# Patient Record
Sex: MALE | NOT HISPANIC OR LATINO | Employment: OTHER | URBAN - METROPOLITAN AREA
[De-identification: names, ages, dates, MRNs, and addresses within clinical notes are randomized per-mention and may not be internally consistent; named-entity substitution may affect disease eponyms.]

---

## 2020-03-02 DIAGNOSIS — Z12.11 SCREENING FOR COLON CANCER: Primary | ICD-10-CM

## 2020-03-11 ENCOUNTER — OUTSIDE FACILITY SERVICE (OUTPATIENT)
Dept: GASTROENTEROLOGY | Facility: CLINIC | Age: 80
End: 2020-03-11

## 2020-03-11 ENCOUNTER — LAB REQUISITION (OUTPATIENT)
Dept: LAB | Facility: HOSPITAL | Age: 80
End: 2020-03-11

## 2020-03-11 DIAGNOSIS — K92.1 MELENA: ICD-10-CM

## 2020-03-11 PROCEDURE — 45381 COLONOSCOPY SUBMUCOUS NJX: CPT | Performed by: INTERNAL MEDICINE

## 2020-03-11 PROCEDURE — 45385 COLONOSCOPY W/LESION REMOVAL: CPT | Performed by: INTERNAL MEDICINE

## 2020-03-11 PROCEDURE — 88305 TISSUE EXAM BY PATHOLOGIST: CPT | Performed by: INTERNAL MEDICINE

## 2020-03-11 PROCEDURE — 45388 COLONOSCOPY W/ABLATION: CPT | Performed by: INTERNAL MEDICINE

## 2020-03-11 PROCEDURE — 45380 COLONOSCOPY AND BIOPSY: CPT | Performed by: INTERNAL MEDICINE

## 2020-03-12 LAB
CYTO UR: NORMAL
LAB AP CASE REPORT: NORMAL
LAB AP CLINICAL INFORMATION: NORMAL
LAB AP DIAGNOSIS COMMENT: NORMAL
PATH REPORT.FINAL DX SPEC: NORMAL
PATH REPORT.GROSS SPEC: NORMAL

## 2020-06-25 ENCOUNTER — OFFICE VISIT (OUTPATIENT)
Dept: NEUROSURGERY | Facility: CLINIC | Age: 80
End: 2020-06-25

## 2020-06-25 VITALS
WEIGHT: 150 LBS | SYSTOLIC BLOOD PRESSURE: 220 MMHG | TEMPERATURE: 97.8 F | DIASTOLIC BLOOD PRESSURE: 60 MMHG | BODY MASS INDEX: 20.32 KG/M2 | HEIGHT: 72 IN

## 2020-06-25 DIAGNOSIS — G89.29 CHRONIC LOW BACK PAIN WITHOUT SCIATICA, UNSPECIFIED BACK PAIN LATERALITY: Primary | ICD-10-CM

## 2020-06-25 DIAGNOSIS — M54.50 CHRONIC LOW BACK PAIN WITHOUT SCIATICA, UNSPECIFIED BACK PAIN LATERALITY: Primary | ICD-10-CM

## 2020-06-25 PROCEDURE — 99203 OFFICE O/P NEW LOW 30 MIN: CPT | Performed by: PHYSICIAN ASSISTANT

## 2020-06-25 RX ORDER — HYDROCHLOROTHIAZIDE 12.5 MG/1
CAPSULE, GELATIN COATED ORAL
COMMUNITY
Start: 2020-06-19

## 2020-06-25 RX ORDER — CYCLOBENZAPRINE HCL 5 MG
5 TABLET ORAL 3 TIMES DAILY PRN
Qty: 60 TABLET | Refills: 1 | Status: SHIPPED | OUTPATIENT
Start: 2020-06-25 | End: 2020-07-14

## 2020-06-25 RX ORDER — DILTIAZEM HYDROCHLORIDE 120 MG/1
120 TABLET, FILM COATED ORAL 4 TIMES DAILY
COMMUNITY

## 2020-06-25 RX ORDER — LIDOCAINE 50 MG/G
1 PATCH TOPICAL EVERY 24 HOURS
Qty: 30 PATCH | Refills: 1 | Status: SHIPPED | OUTPATIENT
Start: 2020-06-25

## 2020-06-25 RX ORDER — CLOPIDOGREL BISULFATE 75 MG/1
TABLET ORAL DAILY
COMMUNITY
Start: 2020-04-07

## 2020-06-25 RX ORDER — QUINAPRIL 40 MG/1
40 TABLET ORAL DAILY
COMMUNITY
Start: 2020-04-07

## 2020-06-25 NOTE — PROGRESS NOTES
Patient: Brando Crowell  : 1940  Gender: male    Primary Care Provider: Chaz Calderon III, MD      Chief Complaint:   Chief Complaint   Patient presents with   • Back Pain       History of Present Illness:  Brando Crowell is a 79-year-old retired gentleman who presents today for evaluation of back pain.  Patient reports low back pain that started in January with no fall or trauma prior to onset.  He denies leg pain, sensory alteration his lower extremities, bowel or bladder dysfunction.  He has not tried any medication for pain.  He has not been to formal therapy.  He denies previous intervention on his back.  He presents today with a lumbar CT report.    Patient has a PMH significant for COPD, CAD, HTN.  He smokes 2 packs/day.      Past Medical and Surgical History:  Past Medical History:   Diagnosis Date   • Emphysema lung (CMS/HCC)    • Heart disease    • Hypertension    • Prostate cancer (CMS/HCC)      Past Surgical History:   Procedure Laterality Date   • UMBILICAL HERNIA REPAIR         Current Medications:    Current Outpatient Medications:   •  dilTIAZem (CARDIZEM) 120 MG tablet, Take 120 mg by mouth 4 (Four) Times a Day., Disp: , Rfl:   •  clopidogrel (PLAVIX) 75 MG tablet, Take  by mouth Daily., Disp: , Rfl:   •  cyclobenzaprine (FLEXERIL) 5 MG tablet, Take 1 tablet by mouth 3 (Three) Times a Day As Needed for Muscle Spasms., Disp: 60 tablet, Rfl: 1  •  hydroCHLOROthiazide (MICROZIDE) 12.5 MG capsule, TK 1 C PO QD IN THE MORNING, Disp: , Rfl:   •  lidocaine (Lidoderm) 5 %, Place 1 patch on the skin as directed by provider Daily. Remove & Discard patch within 12 hours or as directed by MD, Disp: 30 patch, Rfl: 1  •  quinapril (ACCUPRIL) 40 MG tablet, Take 40 mg by mouth Daily., Disp: , Rfl:   •  Sod Picosulfate-Mag Ox-Cit Acd 10-3.5-12 MG-GM -GM/160ML solution, Take 1 kit by mouth Take As Directed. Follow instructions that were mailed to your home. If you didn't receive these call [phone]  [phone]., Disp: 2 bottle, Rfl: 0    Allergies:  No Known Allergies    Review of Systems:  Review of Systems   HENT: Positive for hearing loss.    Respiratory: Positive for cough and shortness of breath.    Cardiovascular: Positive for leg swelling.   Gastrointestinal: Positive for abdominal distention, abdominal pain and diarrhea.   All other systems reviewed and are negative.        Physical Examination:  Vitals:    06/25/20 1143   BP: (!) 220/60   Temp: 97.8 °F (36.6 °C)   Weight: 68 kg (150 lb)   Height: 182.9 cm (72\")       Physical Exam   Constitutional: He is oriented to person, place, and time. He appears well-developed and well-nourished. No distress.   HENT:   Head: Normocephalic and atraumatic.   Neck: Normal range of motion. Neck supple.   Musculoskeletal: Normal range of motion. He exhibits no edema, tenderness or deformity.   Neurological: He is alert and oriented to person, place, and time. He has normal strength. No sensory deficit. Coordination and gait normal.   Reflex Scores:       Bicep reflexes are 2+ on the right side and 2+ on the left side.       Brachioradialis reflexes are 2+ on the right side and 2+ on the left side.       Patellar reflexes are 1+ on the right side and 1+ on the left side.       Achilles reflexes are 1+ on the right side and 1+ on the left side.  Gait appears normal  Increased thoracic kyphosis noted upon standing and walking   Skin: Skin is warm and dry. He is not diaphoretic.   Psychiatric: He has a normal mood and affect.       Imaging Review:  CT lumbar spine dated 1/31/2020 CT imaging report only available which states moderate to advanced degenerative changes with multilevel old mild compression fractures.  Moderate to advanced neuroforaminal narrowing in the lower lumbar spine.    Assessment:  1.  Low back pain    Plan:  Brando Crowell is seen today for evaluation of low back pain which occurred around 6 months ago with no fall or trauma prior to onset.  His back " pain appears to be axial in nature with no radicular component.  He denies symptoms of neurogenic claudication.  I referred him to physical therapy and pain management for consideration of EMMANUEL.  He is unable to tolerate NSAIDs therefore I recommended Tylenol and Flexeril when needed for pain.    Patient's blood pressure was 220/60 upon arrival today with recheck after our visit 162/60.  I have counseled patient on the risks of uncontrolled hypertension and encouraged him to see his PCP soon as possible regarding this issue.    Patient return in 2-3 months following some therapy or sooner if clinically indicated.      Valarie Bell PA-C

## 2020-07-06 ENCOUNTER — TELEPHONE (OUTPATIENT)
Dept: NEUROSURGERY | Facility: CLINIC | Age: 80
End: 2020-07-06

## 2020-07-06 NOTE — TELEPHONE ENCOUNTER
Provider:  Topher  Caller: fax  Time of call:   8:37  Phone #:  [phone]  Surgery:  no  Surgery Date:    Last visit:   06/25/20  Next visit: 08/24/20    MU:         Reason for call:     Insurance will not pay for Flexeril or Lidocaine 5 % patch, even with a PA.

## 2020-07-14 RX ORDER — TIZANIDINE 2 MG/1
TABLET ORAL
Qty: 60 TABLET | Refills: 1 | Status: SHIPPED | OUTPATIENT
Start: 2020-07-14

## 2020-07-14 NOTE — TELEPHONE ENCOUNTER
Provider:Topher    Caller: Pharmacy    Surgery:  n/a  Last visit:   6/25/2020  Next visit: 8/24/20       Reason for call:       Requested Prescriptions     Pending Prescriptions Disp Refills   • tiZANidine (ZANAFLEX) 2 MG tablet [Pharmacy Med Name: TIZANIDINE 2MG TABLETS] 30 tablet      Sig: TAKE 1 TABLET BY MOUTH THREE TIMES DAILY

## 2020-07-28 ENCOUNTER — LAB (OUTPATIENT)
Dept: LAB | Facility: HOSPITAL | Age: 80
End: 2020-07-28

## 2020-07-28 ENCOUNTER — OFFICE VISIT (OUTPATIENT)
Dept: GASTROENTEROLOGY | Facility: CLINIC | Age: 80
End: 2020-07-28

## 2020-07-28 VITALS
TEMPERATURE: 97.3 F | BODY MASS INDEX: 20.48 KG/M2 | OXYGEN SATURATION: 98 % | WEIGHT: 151 LBS | HEART RATE: 50 BPM | SYSTOLIC BLOOD PRESSURE: 188 MMHG | DIASTOLIC BLOOD PRESSURE: 98 MMHG

## 2020-07-28 DIAGNOSIS — R10.9 CHRONIC ABDOMINAL PAIN: ICD-10-CM

## 2020-07-28 DIAGNOSIS — R10.9 CHRONIC ABDOMINAL PAIN: Primary | ICD-10-CM

## 2020-07-28 DIAGNOSIS — Z11.59 ENCOUNTER FOR SCREENING FOR OTHER VIRAL DISEASES: ICD-10-CM

## 2020-07-28 DIAGNOSIS — K56.50 INTESTINAL ADHESIONS WITH OBSTRUCTION, UNSPECIFIED WHETHER PARTIAL OR COMPLETE (HCC): ICD-10-CM

## 2020-07-28 DIAGNOSIS — G89.29 CHRONIC ABDOMINAL PAIN: ICD-10-CM

## 2020-07-28 DIAGNOSIS — G89.29 CHRONIC ABDOMINAL PAIN: Primary | ICD-10-CM

## 2020-07-28 LAB
BASOPHILS # BLD AUTO: 0.04 10*3/MM3 (ref 0–0.2)
BASOPHILS NFR BLD AUTO: 0.5 % (ref 0–1.5)
DEPRECATED RDW RBC AUTO: 44.9 FL (ref 37–54)
EOSINOPHIL # BLD AUTO: 0.18 10*3/MM3 (ref 0–0.4)
EOSINOPHIL NFR BLD AUTO: 2.3 % (ref 0.3–6.2)
ERYTHROCYTE [DISTWIDTH] IN BLOOD BY AUTOMATED COUNT: 14.8 % (ref 12.3–15.4)
HCT VFR BLD AUTO: 35.9 % (ref 37.5–51)
HGB BLD-MCNC: 11.7 G/DL (ref 13–17.7)
IMM GRANULOCYTES # BLD AUTO: 0.09 10*3/MM3 (ref 0–0.05)
IMM GRANULOCYTES NFR BLD AUTO: 1.2 % (ref 0–0.5)
LYMPHOCYTES # BLD AUTO: 1.61 10*3/MM3 (ref 0.7–3.1)
LYMPHOCYTES NFR BLD AUTO: 20.7 % (ref 19.6–45.3)
MCH RBC QN AUTO: 26.8 PG (ref 26.6–33)
MCHC RBC AUTO-ENTMCNC: 32.6 G/DL (ref 31.5–35.7)
MCV RBC AUTO: 82.3 FL (ref 79–97)
MONOCYTES # BLD AUTO: 0.78 10*3/MM3 (ref 0.1–0.9)
MONOCYTES NFR BLD AUTO: 10 % (ref 5–12)
NEUTROPHILS NFR BLD AUTO: 5.09 10*3/MM3 (ref 1.7–7)
NEUTROPHILS NFR BLD AUTO: 65.3 % (ref 42.7–76)
NRBC BLD AUTO-RTO: 0 /100 WBC (ref 0–0.2)
PLATELET # BLD AUTO: 314 10*3/MM3 (ref 140–450)
PMV BLD AUTO: 11.1 FL (ref 6–12)
RBC # BLD AUTO: 4.36 10*6/MM3 (ref 4.14–5.8)
WBC # BLD AUTO: 7.79 10*3/MM3 (ref 3.4–10.8)

## 2020-07-28 PROCEDURE — 36415 COLL VENOUS BLD VENIPUNCTURE: CPT

## 2020-07-28 PROCEDURE — 99214 OFFICE O/P EST MOD 30 MIN: CPT | Performed by: INTERNAL MEDICINE

## 2020-07-28 PROCEDURE — 86671 FUNGUS NES ANTIBODY: CPT

## 2020-07-28 PROCEDURE — 86480 TB TEST CELL IMMUN MEASURE: CPT

## 2020-07-28 PROCEDURE — 83516 IMMUNOASSAY NONANTIBODY: CPT

## 2020-07-28 PROCEDURE — 80053 COMPREHEN METABOLIC PANEL: CPT

## 2020-07-28 PROCEDURE — 86140 C-REACTIVE PROTEIN: CPT

## 2020-07-28 PROCEDURE — 85025 COMPLETE CBC W/AUTO DIFF WBC: CPT

## 2020-07-28 PROCEDURE — 86704 HEP B CORE ANTIBODY TOTAL: CPT

## 2020-07-28 PROCEDURE — 87340 HEPATITIS B SURFACE AG IA: CPT

## 2020-07-28 PROCEDURE — 86255 FLUORESCENT ANTIBODY SCREEN: CPT

## 2020-07-28 NOTE — PROGRESS NOTES
PCP: Chaz Calderon III, MD    Chief Complaint   Patient presents with   • 3 MONTH F/U       History of Present Illness:   Brando Crowell is a 80 y.o. male who presents to GI clinic as a follow up for chronic abdominal pain. Previously followed with dr. wilburn for chronic abdominal pain and loose bms. He is noted to have a h/o small bowel obstruction. Pathology had intrinsic stenosis but the final pathology did not exactly label crohn's disease as external adhesions was felt to be more likely.  He reports continued abdominal pain. Leaning to the right in upright position helps. The pain is sharp at times and intermittent. The pain may last hours. He is s/p colonoscopy with poor prep and a rectal tv adenoma was removed.    Past Medical History:   Diagnosis Date   • Emphysema lung (CMS/HCC)    • Heart disease    • Hypertension    • Prostate cancer (CMS/HCC)        Past Surgical History:   Procedure Laterality Date   • UMBILICAL HERNIA REPAIR           Current Outpatient Medications:   •  clopidogrel (PLAVIX) 75 MG tablet, Take  by mouth Daily., Disp: , Rfl:   •  dilTIAZem (CARDIZEM) 120 MG tablet, Take 120 mg by mouth 4 (Four) Times a Day., Disp: , Rfl:   •  hydroCHLOROthiazide (MICROZIDE) 12.5 MG capsule, TK 1 C PO QD IN THE MORNING, Disp: , Rfl:   •  lidocaine (Lidoderm) 5 %, Place 1 patch on the skin as directed by provider Daily. Remove & Discard patch within 12 hours or as directed by MD, Disp: 30 patch, Rfl: 1  •  quinapril (ACCUPRIL) 40 MG tablet, Take 40 mg by mouth Daily., Disp: , Rfl:   •  tiZANidine (ZANAFLEX) 2 MG tablet, TAKE 1 TABLET BY MOUTH THREE TIMES DAILY, Disp: 60 tablet, Rfl: 1    No Known Allergies    No family history on file.    Social History     Socioeconomic History   • Marital status:      Spouse name: Not on file   • Number of children: Not on file   • Years of education: Not on file   • Highest education level: Not on file   Tobacco Use   • Smoking status: Current Every  Day Smoker     Packs/day: 1.50     Years: 60.00     Pack years: 90.00   • Smokeless tobacco: Never Used   Substance and Sexual Activity   • Alcohol use: Yes   • Drug use: Defer   • Sexual activity: Defer       Review of Systems   Constitutional: Negative.    HENT: Negative.    Eyes: Negative.    Respiratory: Positive for cough, shortness of breath (COPD) and wheezing.    Cardiovascular: Negative.    Gastrointestinal: Positive for abdominal pain, diarrhea, nausea and vomiting.   Endocrine: Negative.    Genitourinary: Negative.    Musculoskeletal: Negative.    Skin: Negative.    Allergic/Immunologic: Negative.    Neurological: Negative.    Hematological: Negative.    Psychiatric/Behavioral: Negative.    All other systems reviewed and are negative.        There were no vitals filed for this visit.    Physical Exam  General Appearance:  Vitals as above. no acute distress  elderly  Head/face:  Normocephalic, atraumatic  Eyes:   EOMI, no conjunctivitis or icterus   Nose/Sinuses:  Nares patent bilaterally without discharge or lesions  Mouth/Throat:  Posterior pharynx is pink without drainage or exudates;  dentition is in good condition and repair  Neck:  trachea is midline, no thyromegaly  Neurologic:  Alert; no focal deficits; age appropriate behavior and speech  Psychiatric: mood and affect are congruent  Skin: no rash or cyanosis.  Abdomen:   Soft, midline laparatomy scar, incisional hernia with pain on palpation.    Assessment/Plan  1.) Chronic abdominal pain  2.) s/p ileal bypass to colon for ? Intrinsic stenosing disease vs external adhesions  3.) Poor prep colonoscopy 3/2020  4.) Rectal tubulovillous adenoma    He is having pain on palpation of incisional hernia. He has certain positions which make the pain better suggestive for abdominal wall type pain.  He had a poor prep colonoscopy and has urgent liquid stool suggestive for a component of constipation pain and overflow.  He followed with dr. wilburn and was  unhappy with the care and I'm second opinion.    Plan:  Will evaluate for crohn's disease with CTEnterography  ibd serology  Hold constipation medicines as he had a poor prep  Start stool softener  Cbc, cmp, crp  ? Crohn's disease   If workup negative for crohn's disease, will consider egd vs abdominal wall injection referral  Will hold on repeat colonoscopy with increased bowel prep until abdominal pain improved.  rtc 8 weeks      Danny Maciel MD  7/28/2020

## 2020-07-29 ENCOUNTER — TELEPHONE (OUTPATIENT)
Dept: GASTROENTEROLOGY | Facility: CLINIC | Age: 80
End: 2020-07-29

## 2020-07-29 LAB
ALBUMIN SERPL-MCNC: 3.7 G/DL (ref 3.5–5.2)
ALBUMIN/GLOB SERPL: 1.3 G/DL
ALP SERPL-CCNC: 75 U/L (ref 39–117)
ALT SERPL W P-5'-P-CCNC: 18 U/L (ref 1–41)
ANION GAP SERPL CALCULATED.3IONS-SCNC: 7.7 MMOL/L (ref 5–15)
AST SERPL-CCNC: 20 U/L (ref 1–40)
BILIRUB SERPL-MCNC: 0.2 MG/DL (ref 0–1.2)
BUN SERPL-MCNC: 27 MG/DL (ref 8–23)
BUN/CREAT SERPL: 16.6 (ref 7–25)
CALCIUM SPEC-SCNC: 9.2 MG/DL (ref 8.6–10.5)
CHLORIDE SERPL-SCNC: 110 MMOL/L (ref 98–107)
CO2 SERPL-SCNC: 20.3 MMOL/L (ref 22–29)
CREAT SERPL-MCNC: 1.63 MG/DL (ref 0.76–1.27)
CRP SERPL-MCNC: 0.54 MG/DL (ref 0–0.5)
GFR SERPL CREATININE-BSD FRML MDRD: 41 ML/MIN/1.73
GFR SERPL CREATININE-BSD FRML MDRD: 50 ML/MIN/1.73
GLOBULIN UR ELPH-MCNC: 2.8 GM/DL
GLUCOSE SERPL-MCNC: 83 MG/DL (ref 65–99)
HBV SURFACE AG SERPL QL IA: NORMAL
HOLD SPECIMEN: NORMAL
POTASSIUM SERPL-SCNC: 4.3 MMOL/L (ref 3.5–5.2)
PROT SERPL-MCNC: 6.5 G/DL (ref 6–8.5)
SODIUM SERPL-SCNC: 138 MMOL/L (ref 136–145)

## 2020-07-30 LAB — HBV CORE AB SER DONR QL IA: NEGATIVE

## 2020-07-31 LAB
QUANTIFERON CRITERIA: NORMAL
QUANTIFERON MITOGEN VALUE: >10 IU/ML
QUANTIFERON NIL VALUE: 0.03 IU/ML
QUANTIFERON TB1 AG VALUE: 0.03 IU/ML
QUANTIFERON TB2 AG VALUE: 0.04 IU/ML
QUANTIFERON-TB GOLD PLUS: NEGATIVE

## 2020-08-03 ENCOUNTER — TELEPHONE (OUTPATIENT)
Dept: PULMONOLOGY | Facility: CLINIC | Age: 80
End: 2020-08-03

## 2020-08-03 NOTE — TELEPHONE ENCOUNTER
Per email pt will need to repeat Histioplasma Urine. Pt was last seen by Dr Maciel's office. Called office LVM to inform of email. Office # given.

## 2020-08-06 ENCOUNTER — APPOINTMENT (OUTPATIENT)
Dept: CT IMAGING | Facility: HOSPITAL | Age: 80
End: 2020-08-06

## 2020-08-06 ENCOUNTER — HOSPITAL ENCOUNTER (OUTPATIENT)
Dept: CT IMAGING | Facility: HOSPITAL | Age: 80
Discharge: HOME | End: 2020-08-06
Admitting: INTERNAL MEDICINE

## 2020-08-06 DIAGNOSIS — R10.9 CHRONIC ABDOMINAL PAIN: ICD-10-CM

## 2020-08-06 DIAGNOSIS — G89.29 CHRONIC ABDOMINAL PAIN: ICD-10-CM

## 2020-08-06 DIAGNOSIS — K56.50 INTESTINAL ADHESIONS WITH OBSTRUCTION, UNSPECIFIED WHETHER PARTIAL OR COMPLETE (HCC): ICD-10-CM

## 2020-08-06 DIAGNOSIS — Z11.59 ENCOUNTER FOR SCREENING FOR OTHER VIRAL DISEASES: ICD-10-CM

## 2020-08-06 PROCEDURE — 0 IOPAMIDOL PER 1 ML: Performed by: INTERNAL MEDICINE

## 2020-08-06 PROCEDURE — 74177 CT ABD & PELVIS W/CONTRAST: CPT

## 2020-08-06 RX ADMIN — IOPAMIDOL 145 ML: 755 INJECTION, SOLUTION INTRAVENOUS at 12:03

## 2020-08-11 ENCOUNTER — TELEPHONE (OUTPATIENT)
Dept: GASTROENTEROLOGY | Facility: CLINIC | Age: 80
End: 2020-08-11

## 2020-08-11 NOTE — TELEPHONE ENCOUNTER
Pt sister called, nick and thought pt needed labs done in re: to his kidney's.  I advised I would sent dr. Maciel a message and would return her call as soon as I got a reply. Pt sister voiced understanding with No further questions at this time.

## 2020-08-12 NOTE — TELEPHONE ENCOUNTER
Got the message. I recommend patient to be seen at , dr. Jamal tom. He will want other labs so I would hold off on labs right now.

## 2020-08-13 NOTE — TELEPHONE ENCOUNTER
Referral was sent by Janelle; also returned call to advise you will hold on labs at this time, no answer. No vm available

## 2020-08-24 ENCOUNTER — OFFICE VISIT (OUTPATIENT)
Dept: NEUROSURGERY | Facility: CLINIC | Age: 80
End: 2020-08-24

## 2020-08-24 VITALS
DIASTOLIC BLOOD PRESSURE: 62 MMHG | TEMPERATURE: 99.6 F | HEIGHT: 72 IN | WEIGHT: 145 LBS | BODY MASS INDEX: 19.64 KG/M2 | SYSTOLIC BLOOD PRESSURE: 176 MMHG

## 2020-08-24 DIAGNOSIS — M54.50 CHRONIC LOW BACK PAIN WITHOUT SCIATICA, UNSPECIFIED BACK PAIN LATERALITY: ICD-10-CM

## 2020-08-24 DIAGNOSIS — G89.29 CHRONIC LOW BACK PAIN WITHOUT SCIATICA, UNSPECIFIED BACK PAIN LATERALITY: ICD-10-CM

## 2020-08-24 DIAGNOSIS — Z72.0 TOBACCO USE: Primary | ICD-10-CM

## 2020-08-24 PROCEDURE — 99213 OFFICE O/P EST LOW 20 MIN: CPT | Performed by: PHYSICIAN ASSISTANT

## 2020-08-24 NOTE — PATIENT INSTRUCTIONS
For more information:  [employer] [place]  1-800-QUIT-NOW  [email or web address]    For more information:  [employer] [place]  1-800-QUIT-NOW  [email or web address]

## 2020-08-24 NOTE — PROGRESS NOTES
Patient: Brando Crowell  : 1940  Gender: male    Primary Care Provider: Chaz Calderon III, MD    Requesting Provider: As above    Chief Complaint:   Chief Complaint   Patient presents with   • Back Pain       History of Present Illness:  Mr. Crowell is an 80-year-old gentleman who presents today for follow-up visit.  He was seen in our clinic approximately 2 months ago for evaluation of back pain.  His pain started in January with no fall or trauma prior to onset.  He denied leg pain.  He was referred to pain management for consideration of injections.  Patient states that he did not hear from a pain management office therefore has yet to have an injection.  He has not attempted any other conservative treatment.  He is currently undergoing a work-up for possible liver cancer. He denies new lower extremity pain, numbness or weakness today.      Past Medical and Surgical History:  Past Medical History:   Diagnosis Date   • Emphysema lung (CMS/HCC)    • Heart disease    • Hypertension    • Prostate cancer (CMS/HCC)      Past Surgical History:   Procedure Laterality Date   • UMBILICAL HERNIA REPAIR         Current Medications:    Current Outpatient Medications:   •  clopidogrel (PLAVIX) 75 MG tablet, Take  by mouth Daily., Disp: , Rfl:   •  dilTIAZem (CARDIZEM) 120 MG tablet, Take 120 mg by mouth 4 (Four) Times a Day., Disp: , Rfl:   •  hydroCHLOROthiazide (MICROZIDE) 12.5 MG capsule, TK 1 C PO QD IN THE MORNING, Disp: , Rfl:   •  lidocaine (Lidoderm) 5 %, Place 1 patch on the skin as directed by provider Daily. Remove & Discard patch within 12 hours or as directed by MD, Disp: 30 patch, Rfl: 1  •  quinapril (ACCUPRIL) 40 MG tablet, Take 40 mg by mouth Daily., Disp: , Rfl:   •  tiZANidine (ZANAFLEX) 2 MG tablet, TAKE 1 TABLET BY MOUTH THREE TIMES DAILY, Disp: 60 tablet, Rfl: 1    Allergies:  No Known Allergies      Review of Systems   Constitutional: Positive for fatigue.   Eyes: Negative.    Respiratory:  "Positive for cough, shortness of breath, wheezing and stridor.    Cardiovascular: Positive for leg swelling.   Gastrointestinal: Positive for abdominal distention, abdominal pain and diarrhea.   Endocrine: Negative.    Genitourinary: Positive for dysuria.   Musculoskeletal: Positive for back pain.   Skin: Negative.    Allergic/Immunologic: Negative.    Neurological: Positive for light-headedness and numbness.   Hematological: Bruises/bleeds easily.   Psychiatric/Behavioral:        Confusion         Physical Exam   Constitutional: He is oriented to person, place, and time. He appears well-developed and well-nourished. No distress.   HENT:   Head: Normocephalic and atraumatic.   Neck: Normal range of motion. Neck supple.   Musculoskeletal: Normal range of motion.   Neurological: He is alert and oriented to person, place, and time.   Skin: Skin is warm and dry. He is not diaphoretic.   Psychiatric: He has a normal mood and affect.         Vitals:    08/24/20 1257 08/24/20 1340   BP:  176/62   BP Location:  Right arm   Patient Position:  Sitting   Cuff Size:  Adult   Temp: 99.6 °F (37.6 °C)    Weight: 65.8 kg (145 lb)    Height: 182.9 cm (72\")          Imaging Review:  No new imaging    Assessment:  1. Low back pain     Plan:  Mr. Crowell is seen today in follow up regarding low back pain. He was unable to see PM for injections. At this juncture his symptoms are unchanged. We will check the status of this referral. I encouraged him to continue his medications. He is undergoing a workup for possible liver cancer, therefore we will plan for follow up via telephone. He will contact us with any concerns or worsening symptom moving forward.        Valarie Bell PA-C  "

## 2020-11-13 ENCOUNTER — TELEPHONE (OUTPATIENT)
Dept: PULMONOLOGY | Facility: CLINIC | Age: 80
End: 2020-11-13

## 2020-11-13 NOTE — TELEPHONE ENCOUNTER
Ms. Herrera left  concerning her Dad's O2 readings and wanting to expedite his new patient appointment. Mr. Crowell is scheduled on 11/25/20 with Dr. Clara Osullivan. I called Ms. Herrera and left  asking her to return my call and advise our office if Mr. Crowell needs a sooner appointment.

## 2020-11-23 ENCOUNTER — LAB (OUTPATIENT)
Dept: PULMONOLOGY | Facility: CLINIC | Age: 80
End: 2020-11-23

## 2020-11-23 DIAGNOSIS — Z01.812 BLOOD TESTS PRIOR TO TREATMENT OR PROCEDURE: Primary | ICD-10-CM

## 2020-11-23 PROCEDURE — U0004 COV-19 TEST NON-CDC HGH THRU: HCPCS | Performed by: INTERNAL MEDICINE

## 2020-11-23 PROCEDURE — 99000 SPECIMEN HANDLING OFFICE-LAB: CPT | Performed by: INTERNAL MEDICINE

## 2020-11-24 LAB — SARS-COV-2 RNA RESP QL NAA+PROBE: NOT DETECTED

## 2020-11-25 ENCOUNTER — OFFICE VISIT (OUTPATIENT)
Dept: PULMONOLOGY | Facility: CLINIC | Age: 80
End: 2020-11-25

## 2020-11-25 VITALS
DIASTOLIC BLOOD PRESSURE: 82 MMHG | WEIGHT: 149 LBS | OXYGEN SATURATION: 97 % | HEART RATE: 57 BPM | SYSTOLIC BLOOD PRESSURE: 170 MMHG | BODY MASS INDEX: 20.18 KG/M2 | HEIGHT: 72 IN | TEMPERATURE: 98 F

## 2020-11-25 DIAGNOSIS — J43.9 PULMONARY EMPHYSEMA, UNSPECIFIED EMPHYSEMA TYPE (HCC): ICD-10-CM

## 2020-11-25 DIAGNOSIS — R06.02 SOB (SHORTNESS OF BREATH): Primary | ICD-10-CM

## 2020-11-25 DIAGNOSIS — Z72.0 TOBACCO USE: ICD-10-CM

## 2020-11-25 DIAGNOSIS — R41.0 CONFUSION: ICD-10-CM

## 2020-11-25 PROCEDURE — 99205 OFFICE O/P NEW HI 60 MIN: CPT | Performed by: INTERNAL MEDICINE

## 2020-11-25 PROCEDURE — 94729 DIFFUSING CAPACITY: CPT | Performed by: INTERNAL MEDICINE

## 2020-11-25 PROCEDURE — 94618 PULMONARY STRESS TESTING: CPT | Performed by: INTERNAL MEDICINE

## 2020-11-25 PROCEDURE — 94375 RESPIRATORY FLOW VOLUME LOOP: CPT | Performed by: INTERNAL MEDICINE

## 2020-11-25 PROCEDURE — 94726 PLETHYSMOGRAPHY LUNG VOLUMES: CPT | Performed by: INTERNAL MEDICINE

## 2020-11-25 NOTE — PROGRESS NOTES
Initial Pulmonary Consult Note    Subjective   Reason for consultation/Chief Complaint: Shortness of Breath    Brando Crowell is a 80 y.o. male is being seen for consultation today at the request of Chaz Calderon III*    History of Present Illness  Mr. Crowell is an 81yo M current 2ppd smoker who is referred for shortness of breath and confusion. He was diagnosed with primary liver cancer in October. He was prescribed Anoro but did not take this beyond the sample inhaler as it was not covered by his insurance. His family notes that he has also been confused and they are concerned that he may need oxygen. They do not have a pulse ox at home.     He notes that he first noticed shortness of breath approximately 2 years ago. He does not have any associated wheezing. He has a dry cough. He was recently diagnosed with liver cancer and is followed by [facility]. He is awaiting insurance approval for Y-90 therapy which is radiation beads to the artery feeding the tumor. He continues to smoke and is currently smoking 1.5ppd. He is not interested in smoking. He follows with a Cardiologist but his niece is unsure what type of cardiac workup he has undergone.     Active Ambulatory Problems     Diagnosis Date Noted   • Chronic low back pain without sciatica 06/25/2020   • Tobacco use 08/24/2020   • Emphysema lung (CMS/HCC)    • Confusion 11/25/2020     Resolved Ambulatory Problems     Diagnosis Date Noted   • No Resolved Ambulatory Problems     Past Medical History:   Diagnosis Date   • Heart disease    • Hypertension    • Prostate cancer (CMS/HCC)        Past Surgical History:   Procedure Laterality Date   • UMBILICAL HERNIA REPAIR         History reviewed. No pertinent family history.    Social History     Socioeconomic History   • Marital status:      Spouse name: Not on file   • Number of children: Not on file   • Years of education: Not on file   • Highest education level: Not on file   Tobacco Use  "  • Smoking status: Current Every Day Smoker     Packs/day: 1.50     Years: 60.00     Pack years: 90.00   • Smokeless tobacco: Never Used   Substance and Sexual Activity   • Alcohol use: Yes   • Drug use: Defer   • Sexual activity: Defer       No Known Allergies    Current Outpatient Medications   Medication Sig Dispense Refill   • clopidogrel (PLAVIX) 75 MG tablet Take  by mouth Daily.     • dilTIAZem (CARDIZEM) 120 MG tablet Take 120 mg by mouth 4 (Four) Times a Day.     • hydroCHLOROthiazide (MICROZIDE) 12.5 MG capsule TK 1 C PO QD IN THE MORNING     • lidocaine (Lidoderm) 5 % Place 1 patch on the skin as directed by provider Daily. Remove & Discard patch within 12 hours or as directed by MD 30 patch 1   • quinapril (ACCUPRIL) 40 MG tablet Take 40 mg by mouth Daily.     • tiZANidine (ZANAFLEX) 2 MG tablet TAKE 1 TABLET BY MOUTH THREE TIMES DAILY 60 tablet 1     No current facility-administered medications for this visit.        Review of Systems   Constitutional: Positive for fatigue.   HENT: Negative.    Eyes: Negative.    Respiratory: Positive for cough and shortness of breath.    Cardiovascular: Negative.    Gastrointestinal: Positive for abdominal distention, abdominal pain and diarrhea.   Endocrine: Positive for polyuria.   Genitourinary: Positive for frequency.   Musculoskeletal: Positive for back pain.   Skin: Negative.    Allergic/Immunologic: Negative.    Neurological: Positive for speech difficulty.   Hematological: Bruises/bleeds easily.   Psychiatric/Behavioral: Positive for agitation, confusion and decreased concentration.         Objective   Blood pressure 170/82, pulse 57, temperature 98 °F (36.7 °C), height 182.9 cm (72\"), weight 67.6 kg (149 lb), SpO2 97 %.  Physical Exam  Vitals signs and nursing note reviewed.   Constitutional:       General: He is not in acute distress.     Appearance: He is well-developed.   HENT:      Head: Normocephalic and atraumatic.   Eyes:      General: No scleral " icterus.     Conjunctiva/sclera: Conjunctivae normal.      Pupils: Pupils are equal, round, and reactive to light.   Neck:      Musculoskeletal: Normal range of motion and neck supple.      Thyroid: No thyromegaly.      Trachea: No tracheal deviation.   Cardiovascular:      Rate and Rhythm: Normal rate and regular rhythm.      Heart sounds: Normal heart sounds.   Pulmonary:      Effort: Pulmonary effort is normal. No respiratory distress.      Breath sounds: Decreased breath sounds present. No wheezing.   Abdominal:      General: Bowel sounds are normal.      Palpations: Abdomen is soft.      Tenderness: There is no abdominal tenderness.   Musculoskeletal: Normal range of motion.      Right lower leg: No edema.      Left lower leg: No edema.   Lymphadenopathy:      Cervical: No cervical adenopathy.   Skin:     General: Skin is warm and dry.      Findings: No erythema or rash.   Neurological:      Mental Status: He is alert and oriented to person, place, and time.      Motor: No abnormal muscle tone.      Coordination: Coordination normal.   Psychiatric:         Speech: Speech normal.         Behavior: Behavior normal.         Judgment: Judgment normal.         PFTs:  Performed in clinic and personally reviewed. He had a difficult time performing testing.  There is mild airway obstruction.  There is no restriction. There is positive air trapping.   The DLCO is normal.     6MWT performed in clinic. His starting oxygen saturation on room air was 97%. He was able to walk 706ft which is 48% predicted. His lowest oxygen saturation was 97%.     Imaging:  Chest xray performed in clinic today and personally reviewed. The cardiac silhouette and mediastinal contours are normal. The lungs are hyperinflated bilaterally. There are chronic interstitial changes bilaterally. There is no pneumothorax or pleural effusion.     Impression: Changes of chronic lung disease. No acute findings.     Assessment/Plan   Diagnoses and all orders  for this visit:    1. SOB (shortness of breath) (Primary)  -     XR Chest PA & Lateral  -     Pulmonary Function Test    2. Pulmonary emphysema, unspecified emphysema type (CMS/HCC)  -     Walking Oximetry    3. Tobacco use    4. Confusion        Discussion:  Mr. Crowell is an 81yo M who is referred for shortness of breath and confusion.     1. Mild COPD  - Not currently taking any inhaler therapy.   - He was given a sample of Tudorza with instructions for use.     2. Tobacco Dependence  - Smoking 1.5ppd currently.   - Not interested in cessation.     3. Confusion  - He was not hypoxic at rest or with exertion.   - I have reviewed past lab work and he does not appear to have evidence of CO2 retention either.   - Recommend to explore other causes of confusion such as dementia.     4. Shortness of Breath  - Out of proportion to his pulmonary function tests.   - Consider other etiologies of shortness of breath such as cardiac.     Follow up in 3 months.          I have spent 60 minutes face to face with the patient with greater than 50% of the time spent counseling on review of diagnostic testing, imaging and further management.          Clara SILVA Case, DO  Pulmonary and Critical Care Medicine  Note Electronically Signed